# Patient Record
Sex: MALE | Race: OTHER | HISPANIC OR LATINO | URBAN - METROPOLITAN AREA
[De-identification: names, ages, dates, MRNs, and addresses within clinical notes are randomized per-mention and may not be internally consistent; named-entity substitution may affect disease eponyms.]

---

## 2022-12-09 ENCOUNTER — EMERGENCY (EMERGENCY)
Facility: HOSPITAL | Age: 47
LOS: 1 days | Discharge: HOME | End: 2022-12-09
Attending: EMERGENCY MEDICINE
Payer: COMMERCIAL

## 2022-12-09 VITALS
OXYGEN SATURATION: 95 % | HEART RATE: 88 BPM | RESPIRATION RATE: 19 BRPM | HEIGHT: 69 IN | WEIGHT: 184.97 LBS | SYSTOLIC BLOOD PRESSURE: 144 MMHG | DIASTOLIC BLOOD PRESSURE: 94 MMHG | TEMPERATURE: 99 F

## 2022-12-09 PROCEDURE — 99283 EMERGENCY DEPT VISIT LOW MDM: CPT

## 2022-12-09 PROCEDURE — 99284 EMERGENCY DEPT VISIT MOD MDM: CPT

## 2022-12-09 RX ORDER — ACETAMINOPHEN 500 MG
650 TABLET ORAL ONCE
Refills: 0 | Status: COMPLETED | OUTPATIENT
Start: 2022-12-09 | End: 2022-12-09

## 2022-12-09 RX ADMIN — Medication 650 MILLIGRAM(S): at 15:38
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
## 2022-12-09 NOTE — ED PROVIDER NOTE - PATIENT PORTAL LINK FT
You can access the FollowMyHealth Patient Portal offered by [facility] by registering at the following website: [email or web address]. By joining [facility]’s FollowMyHealth portal, you will also be able to view your health information using other applications (apps) compatible with our system.

## 2022-12-09 NOTE — ED PROVIDER NOTE - NSFOLLOWUPINSTRUCTIONS_ED_ALL_ED_FT
Quemar    Camila quemadura es camila lesión en la piel o en los tejidos debajo de la piel generalmente causada por calor o productos químicos cáusticos. En casos severos, camila quemadura puede dañar los músculos y huesos debajo de la piel. Hay vitaliy grados diferentes de quemaduras: ama (leve), shantell y calli (grave). Asegúrese de usar los ungüentos recetados según las indicaciones. Si le recetaron un medicamento antibiótico, tómelo jarrell se lo indicó padilla proveedor de atención médica. No deje de usar el antibiótico aunque padilla condición mejore. El seguimiento está disponible en la clínica de quemados.    BUSQUE ATENCIÓN MÉDICA INMEDIATA SI TIENE ALGUNO DE LOS SIGUIENTES SÍNTOMAS: vetas martinez cerca de la quemadura, dolor intenso o fiebre.    Seguimiento con padilla médico en 2-3 días.  Puede usar bacitracina de venta arnold para la quemadura.  Puede ramírez Tylenol o Motrin sin receta para el dolor según sea necesario.
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
## 2022-12-09 NOTE — ED PROVIDER NOTE - RESPIRATORY, MLM
How Severe Is It?: moderate Is This A New Presentation, Or A Follow-Up?: Follow Up Isotretinoin Breath sounds clear and equal bilaterally.
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
## 2023-05-30 ENCOUNTER — OFFICE (OUTPATIENT)
Dept: URBAN - METROPOLITAN AREA CLINIC 34 | Facility: CLINIC | Age: 48
Setting detail: OPHTHALMOLOGY
End: 2023-05-30
Payer: MEDICAID

## 2023-05-30 DIAGNOSIS — H25.13: ICD-10-CM

## 2023-05-30 DIAGNOSIS — E11.9: ICD-10-CM

## 2023-05-30 DIAGNOSIS — D31.01: ICD-10-CM

## 2023-05-30 DIAGNOSIS — H16.222: ICD-10-CM

## 2023-05-30 PROCEDURE — 92014 COMPRE OPH EXAM EST PT 1/>: CPT | Performed by: OPHTHALMOLOGY

## 2023-05-30 ASSESSMENT — KERATOMETRY
OD_K2POWER_DIOPTERS: 45.00
OD_K1POWER_DIOPTERS: 45.00
OS_AXISANGLE_DEGREES: 072
OS_K2POWER_DIOPTERS: 45.00
OS_K1POWER_DIOPTERS: 44.25
METHOD_AUTO_MANUAL: AUTO
OD_AXISANGLE_DEGREES: 090

## 2023-05-30 ASSESSMENT — VISUAL ACUITY
OD_BCVA: 20/20
OS_BCVA: 20/20-1

## 2023-05-30 ASSESSMENT — SPHEQUIV_DERIVED
OS_SPHEQUIV: 0
OD_SPHEQUIV: -0.25

## 2023-05-30 ASSESSMENT — DRY EYES - PHYSICIAN NOTES: OS_GENERALCOMMENTS: SPK IN LIMBUS

## 2023-05-30 ASSESSMENT — REFRACTION_AUTOREFRACTION
OS_SPHERE: -0.25
OD_SPHERE: -1.00
OS_CYLINDER: +0.50
OD_AXIS: 003
OS_AXIS: 164
OD_CYLINDER: +1.50

## 2023-05-30 ASSESSMENT — CONFRONTATIONAL VISUAL FIELD TEST (CVF)
OS_FINDINGS: FULL
OD_FINDINGS: FULL

## 2023-05-30 ASSESSMENT — TONOMETRY: OD_IOP_MMHG: 20

## 2023-05-30 ASSESSMENT — AXIALLENGTH_DERIVED
OD_AL: 23.1469
OS_AL: 23.1858

## 2023-05-30 ASSESSMENT — SUPERFICIAL PUNCTATE KERATITIS (SPK): OS_SPK: T 1+

## 2024-06-04 ENCOUNTER — OFFICE (OUTPATIENT)
Dept: URBAN - METROPOLITAN AREA CLINIC 34 | Facility: CLINIC | Age: 49
Setting detail: OPHTHALMOLOGY
End: 2024-06-04
Payer: MEDICARE

## 2024-06-04 DIAGNOSIS — H35.361: ICD-10-CM

## 2024-06-04 DIAGNOSIS — D31.01: ICD-10-CM

## 2024-06-04 DIAGNOSIS — E11.9: ICD-10-CM

## 2024-06-04 DIAGNOSIS — H16.222: ICD-10-CM

## 2024-06-04 DIAGNOSIS — H25.13: ICD-10-CM

## 2024-06-04 PROCEDURE — 92014 COMPRE OPH EXAM EST PT 1/>: CPT | Performed by: OPHTHALMOLOGY

## 2024-06-04 ASSESSMENT — CONFRONTATIONAL VISUAL FIELD TEST (CVF)
OS_FINDINGS: FULL
OD_FINDINGS: FULL

## 2025-07-01 ENCOUNTER — DOCTOR'S OFFICE (OUTPATIENT)
Facility: LOCATION | Age: 50
Setting detail: OPHTHALMOLOGY
End: 2025-07-01

## 2025-07-01 DIAGNOSIS — D31.01: ICD-10-CM

## 2025-07-01 DIAGNOSIS — H25.13: ICD-10-CM

## 2025-07-01 DIAGNOSIS — E11.3291: ICD-10-CM

## 2025-07-01 DIAGNOSIS — H16.222: ICD-10-CM

## 2025-07-01 DIAGNOSIS — H35.361: ICD-10-CM

## 2025-07-01 PROCEDURE — 92014 COMPRE OPH EXAM EST PT 1/>: CPT | Performed by: OPHTHALMOLOGY

## 2025-07-01 ASSESSMENT — KERATOMETRY
OD_K2POWER_DIOPTERS: 45.00
OS_K2POWER_DIOPTERS: 44.50
OS_K1POWER_DIOPTERS: 44.25
OS_AXISANGLE_DEGREES: 051
METHOD_AUTO_MANUAL: AUTO
OD_K1POWER_DIOPTERS: 44.75
OD_AXISANGLE_DEGREES: 011

## 2025-07-01 ASSESSMENT — TONOMETRY
OS_IOP_MMHG: 18
OD_IOP_MMHG: 16

## 2025-07-01 ASSESSMENT — REFRACTION_AUTOREFRACTION
OD_CYLINDER: +1.75
OS_CYLINDER: +0.75
OD_SPHERE: -1.00
OS_SPHERE: -0.50
OS_AXIS: 170
OD_AXIS: 180

## 2025-07-01 ASSESSMENT — VISUAL ACUITY
OD_BCVA: 20/25
OS_BCVA: 20/25+2

## 2025-07-01 ASSESSMENT — CONFRONTATIONAL VISUAL FIELD TEST (CVF)
OS_FINDINGS: FULL
OD_FINDINGS: FULL